# Patient Record
Sex: MALE | Race: OTHER | HISPANIC OR LATINO | URBAN - METROPOLITAN AREA
[De-identification: names, ages, dates, MRNs, and addresses within clinical notes are randomized per-mention and may not be internally consistent; named-entity substitution may affect disease eponyms.]

---

## 2021-11-23 ENCOUNTER — EMERGENCY (EMERGENCY)
Facility: HOSPITAL | Age: 26
LOS: 0 days | Discharge: HOME | End: 2021-11-23
Attending: EMERGENCY MEDICINE
Payer: MEDICAID

## 2021-11-23 VITALS
DIASTOLIC BLOOD PRESSURE: 68 MMHG | OXYGEN SATURATION: 96 % | SYSTOLIC BLOOD PRESSURE: 112 MMHG | RESPIRATION RATE: 18 BRPM | HEART RATE: 82 BPM | TEMPERATURE: 99 F

## 2021-11-23 VITALS — HEIGHT: 65 IN | WEIGHT: 199.96 LBS

## 2021-11-23 DIAGNOSIS — R30.9 PAINFUL MICTURITION, UNSPECIFIED: ICD-10-CM

## 2021-11-23 DIAGNOSIS — M54.9 DORSALGIA, UNSPECIFIED: ICD-10-CM

## 2021-11-23 DIAGNOSIS — R68.83 CHILLS (WITHOUT FEVER): ICD-10-CM

## 2021-11-23 DIAGNOSIS — D72.829 ELEVATED WHITE BLOOD CELL COUNT, UNSPECIFIED: ICD-10-CM

## 2021-11-23 LAB
ALBUMIN SERPL ELPH-MCNC: 4.4 G/DL — SIGNIFICANT CHANGE UP (ref 3.3–5)
ALP SERPL-CCNC: 74 U/L — SIGNIFICANT CHANGE UP (ref 40–120)
ALT FLD-CCNC: 45 U/L — SIGNIFICANT CHANGE UP (ref 12–78)
ANION GAP SERPL CALC-SCNC: 5 MMOL/L — SIGNIFICANT CHANGE UP (ref 5–17)
APPEARANCE UR: CLEAR — SIGNIFICANT CHANGE UP
AST SERPL-CCNC: 28 U/L — SIGNIFICANT CHANGE UP (ref 15–37)
BASOPHILS # BLD AUTO: 0.05 K/UL — SIGNIFICANT CHANGE UP (ref 0–0.2)
BASOPHILS NFR BLD AUTO: 0.3 % — SIGNIFICANT CHANGE UP (ref 0–2)
BILIRUB SERPL-MCNC: 1.2 MG/DL — SIGNIFICANT CHANGE UP (ref 0.2–1.2)
BILIRUB UR-MCNC: NEGATIVE — SIGNIFICANT CHANGE UP
BUN SERPL-MCNC: 13 MG/DL — SIGNIFICANT CHANGE UP (ref 7–23)
CALCIUM SERPL-MCNC: 9.3 MG/DL — SIGNIFICANT CHANGE UP (ref 8.5–10.1)
CHLORIDE SERPL-SCNC: 101 MMOL/L — SIGNIFICANT CHANGE UP (ref 96–108)
CO2 SERPL-SCNC: 29 MMOL/L — SIGNIFICANT CHANGE UP (ref 22–31)
COLOR SPEC: YELLOW — SIGNIFICANT CHANGE UP
CREAT SERPL-MCNC: 1.14 MG/DL — SIGNIFICANT CHANGE UP (ref 0.5–1.3)
DIFF PNL FLD: ABNORMAL
EOSINOPHIL # BLD AUTO: 0.08 K/UL — SIGNIFICANT CHANGE UP (ref 0–0.5)
EOSINOPHIL NFR BLD AUTO: 0.5 % — SIGNIFICANT CHANGE UP (ref 0–6)
GLUCOSE SERPL-MCNC: 91 MG/DL — SIGNIFICANT CHANGE UP (ref 70–99)
GLUCOSE UR QL: NEGATIVE MG/DL — SIGNIFICANT CHANGE UP
HCT VFR BLD CALC: 42.5 % — SIGNIFICANT CHANGE UP (ref 39–50)
HGB BLD-MCNC: 15.3 G/DL — SIGNIFICANT CHANGE UP (ref 13–17)
IMM GRANULOCYTES NFR BLD AUTO: 0.4 % — SIGNIFICANT CHANGE UP (ref 0–1.5)
KETONES UR-MCNC: NEGATIVE — SIGNIFICANT CHANGE UP
LEUKOCYTE ESTERASE UR-ACNC: NEGATIVE — SIGNIFICANT CHANGE UP
LIDOCAIN IGE QN: 140 U/L — SIGNIFICANT CHANGE UP (ref 73–393)
LYMPHOCYTES # BLD AUTO: 12 % — LOW (ref 13–44)
LYMPHOCYTES # BLD AUTO: 2 K/UL — SIGNIFICANT CHANGE UP (ref 1–3.3)
MCHC RBC-ENTMCNC: 30.8 PG — SIGNIFICANT CHANGE UP (ref 27–34)
MCHC RBC-ENTMCNC: 36 GM/DL — SIGNIFICANT CHANGE UP (ref 32–36)
MCV RBC AUTO: 85.5 FL — SIGNIFICANT CHANGE UP (ref 80–100)
MONOCYTES # BLD AUTO: 1.4 K/UL — HIGH (ref 0–0.9)
MONOCYTES NFR BLD AUTO: 8.4 % — SIGNIFICANT CHANGE UP (ref 2–14)
NEUTROPHILS # BLD AUTO: 13 K/UL — HIGH (ref 1.8–7.4)
NEUTROPHILS NFR BLD AUTO: 78.4 % — HIGH (ref 43–77)
NITRITE UR-MCNC: NEGATIVE — SIGNIFICANT CHANGE UP
PH UR: 6.5 — SIGNIFICANT CHANGE UP (ref 5–8)
PLATELET # BLD AUTO: 286 K/UL — SIGNIFICANT CHANGE UP (ref 150–400)
POTASSIUM SERPL-MCNC: 3.7 MMOL/L — SIGNIFICANT CHANGE UP (ref 3.5–5.3)
POTASSIUM SERPL-SCNC: 3.7 MMOL/L — SIGNIFICANT CHANGE UP (ref 3.5–5.3)
PROT SERPL-MCNC: 8.5 GM/DL — HIGH (ref 6–8.3)
PROT UR-MCNC: NEGATIVE MG/DL — SIGNIFICANT CHANGE UP
RBC # BLD: 4.97 M/UL — SIGNIFICANT CHANGE UP (ref 4.2–5.8)
RBC # FLD: 12.5 % — SIGNIFICANT CHANGE UP (ref 10.3–14.5)
SODIUM SERPL-SCNC: 135 MMOL/L — SIGNIFICANT CHANGE UP (ref 135–145)
SP GR SPEC: 1 — LOW (ref 1.01–1.02)
UROBILINOGEN FLD QL: NEGATIVE MG/DL — SIGNIFICANT CHANGE UP
WBC # BLD: 16.6 K/UL — HIGH (ref 3.8–10.5)
WBC # FLD AUTO: 16.6 K/UL — HIGH (ref 3.8–10.5)

## 2021-11-23 PROCEDURE — 99284 EMERGENCY DEPT VISIT MOD MDM: CPT | Mod: 25

## 2021-11-23 PROCEDURE — 74176 CT ABD & PELVIS W/O CONTRAST: CPT | Mod: MA

## 2021-11-23 PROCEDURE — 99285 EMERGENCY DEPT VISIT HI MDM: CPT

## 2021-11-23 PROCEDURE — 87086 URINE CULTURE/COLONY COUNT: CPT

## 2021-11-23 PROCEDURE — 87491 CHLMYD TRACH DNA AMP PROBE: CPT

## 2021-11-23 PROCEDURE — 81001 URINALYSIS AUTO W/SCOPE: CPT

## 2021-11-23 PROCEDURE — 87591 N.GONORRHOEAE DNA AMP PROB: CPT

## 2021-11-23 PROCEDURE — 74176 CT ABD & PELVIS W/O CONTRAST: CPT | Mod: 26,MA

## 2021-11-23 PROCEDURE — 80053 COMPREHEN METABOLIC PANEL: CPT

## 2021-11-23 PROCEDURE — 85025 COMPLETE CBC W/AUTO DIFF WBC: CPT

## 2021-11-23 PROCEDURE — 96360 HYDRATION IV INFUSION INIT: CPT

## 2021-11-23 PROCEDURE — 36415 COLL VENOUS BLD VENIPUNCTURE: CPT

## 2021-11-23 PROCEDURE — 83690 ASSAY OF LIPASE: CPT

## 2021-11-23 RX ORDER — SODIUM CHLORIDE 9 MG/ML
1000 INJECTION INTRAMUSCULAR; INTRAVENOUS; SUBCUTANEOUS ONCE
Refills: 0 | Status: COMPLETED | OUTPATIENT
Start: 2021-11-23 | End: 2021-11-23

## 2021-11-23 RX ORDER — IBUPROFEN 200 MG
600 TABLET ORAL ONCE
Refills: 0 | Status: COMPLETED | OUTPATIENT
Start: 2021-11-23 | End: 2021-11-23

## 2021-11-23 RX ORDER — IBUPROFEN 200 MG
1 TABLET ORAL
Qty: 12 | Refills: 0
Start: 2021-11-23 | End: 2021-11-25

## 2021-11-23 RX ORDER — CYCLOBENZAPRINE HYDROCHLORIDE 10 MG/1
1 TABLET, FILM COATED ORAL
Qty: 9 | Refills: 0
Start: 2021-11-23 | End: 2021-11-25

## 2021-11-23 RX ADMIN — SODIUM CHLORIDE 1000 MILLILITER(S): 9 INJECTION INTRAMUSCULAR; INTRAVENOUS; SUBCUTANEOUS at 01:33

## 2021-11-23 RX ADMIN — Medication 600 MILLIGRAM(S): at 12:03

## 2021-11-23 RX ADMIN — SODIUM CHLORIDE 1000 MILLILITER(S): 9 INJECTION INTRAMUSCULAR; INTRAVENOUS; SUBCUTANEOUS at 12:03

## 2021-11-23 NOTE — ED STATDOCS - ATTENDING CONTRIBUTION TO CARE
I, Makayla Alas MD,  performed the initial face to face bedside interview with this patient regarding history of present illness, review of symptoms and relevant past medical, social and family history.  I completed an independent physical examination.  I was the initial provider who evaluated this patient. I have signed out the follow up of any pending tests (i.e. labs, radiological studies) to the ACP.  I have communicated the patient’s plan of care and disposition with the ACP.  The history, relevant review of systems, past medical and surgical history, medical decision making, and physical examination was documented by the scribe in my presence and I attest to the accuracy of the documentation.

## 2021-11-23 NOTE — ED STATDOCS - PROGRESS NOTE DETAILS
Pt. is a 26 year old male presenting with intermittent LBP x 1 year.  No trauma or injury reported.  Pt. also c/o pain with urination and chills.  Neg. medical history.  Yvrose Mendez PA-C Plan for UA, GC Chlamydia, CT r/o renal colic.  Yvrose Mendez PA-C UA with scant blood, CT unremarkable, Moderate Leukocytosis.  STD testing pending.  Pt. to follow up with [facility].  Labs discussed.   return precautions discussed.  Yvrose Mendez PA-C UA with scant blood, CT unremarkable, Moderate Leukocytosis.  STD testing pending.  Pt. to follow up with [facility].  Labs discussed.   return precautions discussed.   # [id number]  Yvrsoe Mendez PA-C

## 2021-11-23 NOTE — ED STATDOCS - OBJECTIVE STATEMENT
25 y/o male with no significant PMHx presents to the ED c/o intermittent back pain x 1 year. No trauma or injury. +pain with urination, +chills. No hx of abd surgeries. Nonsmoker. No EtOH use. No drug use. No other complaints at this time.  ID#: [id number].

## 2021-11-23 NOTE — ED STATDOCS - PATIENT PORTAL LINK FT
You can access the FollowMyHealth Patient Portal offered by [facility] by registering at the following website: [email or web address]. By joining [employer]’s FollowMyHealth portal, you will also be able to view your health information using other applications (apps) compatible with our system.

## 2021-11-23 NOTE — ED STATDOCS - NSFOLLOWUPINSTRUCTIONS_ED_ALL_ED_FT
Leucocitosis    Leukocytosis      Leucocitosis significa que marlon persona tiene más glóbulos blancos que lo normal. Los glóbulos blancos se ritika en la médula ósea. La médula ósea es el tejido esponjoso que está en el interior de los huesos. La función principal de los glóbulos blancos es luchar contra las infecciones.    Tener demasiados glóbulos blancos es un problema frecuente. Puede aparecer carlos resultado de muchos tipos de problemas médicos.      ¿Cuáles son las causas?  La leucocitosis puede deberse a diversas afecciones. En algunos casos, la médula ósea puede ser normal, tin sigue produciendo demasiados glóbulos blancos. [place] puede ser consecuencia de lo siguiente:  •Infección.      •Lesiones.      •Estrés físico.      •Estrés emocional.      •Cirugía.      •Reacciones alérgicas.      •Tumores que no se inician en la ivan ni en la médula ósea.      •Marlon enfermedad congénita.      •Ciertos medicamentos.      •Embarazo y trabajo de parto.    En otros casos, es posible que haya un trastorno de la médula ósea que hipolito que el organismo produzca demasiados glóbulos blancos. Las enfermedades de la médula ósea pueden ser las siguientes:  •Leucemia. Gabi es un tipo de cáncer de la ivan.      •Trastornos mieloproliferativos. Estos trastornos hacen que las células sanguíneas crezcan de manera anormal.        ¿Cuáles son los signos o los síntomas?  Con frecuencia, esta afección no tiene síntomas. Algunas personas pueden tener síntomas debido a la enfermedad que origina la leucocitosis. Estos síntomas pueden incluir los siguientes:  •Sangrado.      •Moretones.      •Fiebre.      •Sudoración nocturna.      •Ganglios linfáticos hinchados.      •Agrandamiento del bazo.      •Infecciones repetidas.      •Debilidad.      •Pérdida de peso.        ¿Cómo se diagnostica?  Esta afección se diagnostica con análisis de ivan. Con frecuencia se detecta con un análisis de ivan que se realiza carlos parte de un examen físico de rutina. Podrán indicarle otras pruebas para ayudar a determinar por qué tiene demasiados glóbulos blancos. Estas pruebas pueden incluir lo siguiente:  •Un hemograma completo. Con gabi análisis se evalúan todos los tipos de células sanguíneas del organismo.      •Radiografía de tórax, análisis de orina u otros exámenes para buscar signos de infección.      •Aspiración de médula ósea. Para realizar esta prueba, se introduce marlon aguja en el hueso. Las células de la médula ósea se extraen con la aguja y se examinan con un microscopio.      •Otros estudios que se realizan con marlon muestra de ivan o médula ósea.      •Exploración por tomografía computarizada (TC), gammagrafía ósea u otros estudios de diagnóstico por imágenes.        ¿Cómo se trata?  Generalmente no es necesario realizar un tratamiento para la leucocitosis. Sin embargo, si la causa de la leucocitosis es marlon infección, un cáncer, un trastorno de la médula ósea u otro problema grave, deberá recibir tratamiento. El tratamiento puede incluir:  •Control periódico del recuento de glóbulos blancos para detectar cambios.      •Antibióticos, si tiene marlon infección bacteriana.      •Trasplante de médula ósea. La médula ósea enferma se reemplaza con células sanas que desarrollarán marlon nueva médula.      •Quimioterapia o terapias biológicas carlos el uso de anticuerpos. Estos tratamientos se pueden usar para destruir las células cancerosas o disminuir el número de glóbulos blancos.        Siga estas instrucciones en cunningham casa:    Medicamentos     •[place] los medicamentos de venta ryley y los recetados solamente carlos se lo haya indicado el médico.      •Si le recetaron un antibiótico, tómelo carlos se lo haya indicado el médico. No deje de caleb los antibióticos aunque comience a sentirse mejor.        Comida y bebida      •Consuma alimentos bajos en grasas saturadas y altos en contenido de fibra. Coma muchas frutas y verduras frescas.      •Juliana suficiente líquido carlos para mantener la orina de color amarillo pálido.      •Reduzca la ingesta de cafeína y alcohol.      Instrucciones generales     •Mantenga un peso saludable. Pregúntele al médico cuál es el peso más adecuado para usted.      •Hipolito 30 minutos de actividad física al menos 5 veces por semana. Hable con el médico antes de comenzar marlon nueva rutina de actividad física.      •Siga las precauciones de seguridad que le haya indicado el médico. [place] puede ser necesario si usted corre un mayor riesgo de infección o sangrado debido a cunningham afección.      • No consuma ningún producto que contenga nicotina o tabaco, carlos cigarrillos, cigarrillos electrónicos y tabaco de mascar. Si necesita ayuda para dejar de fumar, consulte al médico.      •Concurra a todas las visitas de seguimiento carlos se lo haya indicado el médico. [place] es importante.        Comuníquese con un médico si:    •Se siente débil o más cansado que de costumbre.      •Tiene escalofríos, tos o congestión nasal.      •Tiene fiebre.      •Pierde peso sin proponérselo.      •Transpira hazel la noche.      •Tiene moretones con facilidad.      •Tiene síntomas nuevos o greer síntomas empeoran.        Solicite ayuda inmediatamente si:    •Sangra más de lo normal.      •Siente dolor en el pecho.      •Tiene dificultad para respirar.      •Tiene náuseas o vómitos que no puede controlar.      •Se siente mareado o aturdido.        Resumen    •Leucocitosis significa que marlon persona tiene más glóbulos blancos que lo normal.      •Esta afección a menudo no causa síntomas.      •Esta afección puede deberse a diferentes trastornos.      •Si la causa de la leucocitosis es marlon infección, un cáncer, un trastorno de la médula ósea u otro problema grave, deberá recibir tratamiento.      •Concurra a todas las visitas de seguimiento carlos se lo haya indicado el médico. [place] es importante.      Esta información no tiene carlos fin reemplazar el consejo del médico. Asegúrese de hacerle al médico cualquier pregunta que tenga.      Dolor de espalda crónico    Chronic Back Pain    Cuando el dolor en la espalda dura más de 3 meses, se denomina dolor de espalda crónico. Es posible que se desconozca la causa de esta afección. Algunas causas frecuentes son las siguientes:  •Desgaste (enfermedad degenerativa) de los huesos, los ligamentos o los discos de la espalda.      •Inflamación y rigidez en la espalda (artritis).      Las personas que sufren dolor de espalda crónico generalmente atraviesan determinados períodos en los que gabi es más intenso (episodios de exacerbación del dolor). Muchas personas pueden aprender a controlar el dolor de espalda con el cuidado en el hogar.      Siga estas instrucciones en cunningham casa:    Esté atento a cualquier cambio en los síntomas. [place] estas medidas para aliviar el dolor:      Control del dolor y de la rigidez                 •Si se lo indican, aplique hielo sobre la ciro dolorida. El médico puede recomendarle que se aplique hielo hazel las primeras 24 a 48 horas después del comienzo de un episodio de exacerbación del dolor. Para hacer esto:  •Ponga el hielo en marlon bolsa plástica.      •Coloque marlon toalla entre la piel y la bolsa.      •Coloque el hielo hazel 20 minutos, 2 a 3 veces al día.      •Si se lo indican, aplique calor en la ciro afectada con la frecuencia que le haya indicado el médico. Use la elvira de calor que el médico le recomiende, carlos marlon compresa de calor húmedo o marlon almohadilla térmica.  •Coloque marlon toalla entre la piel y la elvira de calor.      •Aplique calor haezl 20 a 30 minutos.      •Retire la elvira de calor si la piel se pone de color barlow brillante. [place] es especialmente importante si no puede sentir dolor, calor o frío. Puede correr un riesgo mayor de sufrir quemaduras.        •Intente caleb un baño de inmersión con [place].        Actividad      •Evite agacharse y realizar otras actividades que agraven el problema.    •Mantenga marlon postura correcta mientras está de pie o sentado:  •Cuando esté de pie, mantenga la parte linda de la espalda y el alexsandra rectos, con los hombros hacia atrás. Evite encorvarse.      •Cuando esté sentado, mantenga la espalda recta y relaje los hombros. No curve los hombros ni los eche hacia atrás.        • No permanezca sentado o de pie en el mismo lugar hazel mucho tiempo.      •Hazel el día, descanse hazel lapsos breves. [place] le aliviará el dolor. Descansar recostado o de pie suele ser mejor que hacerlo sentado.      •Cuando descanse hazel períodos más largos, incorpore alguna actividad suave o ejercicios de elongación entre silvana y otro. [place] ayudará a evitar la rigidez y el dolor.      •Hipolito ejercicio con regularidad. Pregúntele al médico qué actividades son seguras para usted.    • No levante ningún objeto que pese más de 10 libras (4.5 kg) o que supere el límite de peso que le hayan indicado, hasta que el médico le diga que puede hacerlo. Siempre use las técnicas correctas para levantar objetos, entre ellas:  •Flexionar las rodillas.      •Mantener la carga cerca del cuerpo.      •No torcerse.        •Duerma sobre un colchón firme en marlon posición cómoda. Intente acostarse de costado, con las rodillas ligeramente flexionadas. Si se recuesta sobre la espalda, coloque marlon almohada debajo de las rodillas.      Medicamentos     •El tratamiento puede incluir medicamentos para el dolor y la inflamación que se may por boca o que se aplican sobre la piel, analgésicos recetados o relajantes musculares. Use los medicamentos de venta ryley y los recetados solamente carlos se lo haya indicado el médico.    •Pregúntele al médico si el medicamento recetado:  •Hace necesario que evite conducir o usar maquinaria.    •Puede causarle estreñimiento. Es posible que tenga que caleb estas medidas para prevenir o tratar el estreñimiento:  •Beber suficiente líquido carlos para mantener la orina de color amarillo pálido.      •Usar medicamentos recetados o de venta ryley.      •Consumir alimentos ricos en fibra, carlos frijoles, cereales integrales, y frutas y verduras frescas.      •Limitar el consumo de alimentos ricos en grasa y azúcares procesados, carlos los alimentos fritos o dulces.          Instrucciones generales     • No consuma ningún producto que contenga nicotina o tabaco, carlos cigarrillos, cigarrillos electrónicos y tabaco de mascar. Si necesita ayuda para dejar de consumir estos productos, consulte al médico.      •Concurra a todas las visitas de seguimiento carlos se lo haya indicado el médico. [place] es importante.        Comuníquese con un médico si:    •Siente un dolor que no se la con reposo o medicamentos.      •[place] dolor empeora o tiene un dolor nuevo.      •Tiene fiebre linda.      •Pierde de peso con rapidez.      •Tiene dificultad para realizar las actividades cotidianas.        Solicite ayuda de inmediato si:    •Siente debilidad o adormecimiento en marlon o ambas piernas, o en silvana o ambos pies.      •Tiene dificultad para controlar la micción o la defecación.    •Siente un dolor intenso en la espalda y tiene alguno de los siguientes síntomas:  •Náuseas o vómitos.      •Dolor en el abdomen.      •Le falta el aire o se desmaya.          Resumen    •El dolor de espalda crónico es aquel que dura más de 3 meses.      •Cuando comienza un episodio de exacerbación del dolor, aplique hielo en la ciro dolorida hazel las primeras 24 a 48 horas.      •Aplíquese marlon compresa de calor húmedo o marlon almohadilla térmica carlos se lo haya indicado el médico.      •Cuando descanse hazel períodos más largos, incorpore alguna actividad suave o ejercicios de elongación entre silvana y otro. [place] ayudará a evitar la rigidez y el dolor.      Esta información no tiene carlos fin reemplazar el consejo del médico. Asegúrese de hacerle al médico cualquier pregunta que tenga.

## 2021-11-23 NOTE — ED STATDOCS - CLINICAL SUMMARY MEDICAL DECISION MAKING FREE TEXT BOX
Pt with 1 year hx of back pain. Pt here for increasing pain. Pt now with pain with urination. Plan: labs, CT, urine.

## 2021-11-24 LAB
C TRACH RRNA SPEC QL NAA+PROBE: SIGNIFICANT CHANGE UP
CULTURE RESULTS: NO GROWTH — SIGNIFICANT CHANGE UP
N GONORRHOEA RRNA SPEC QL NAA+PROBE: SIGNIFICANT CHANGE UP
SPECIMEN SOURCE: SIGNIFICANT CHANGE UP
SPECIMEN SOURCE: SIGNIFICANT CHANGE UP

## 2023-01-07 ENCOUNTER — EMERGENCY (EMERGENCY)
Facility: HOSPITAL | Age: 28
LOS: 0 days | Discharge: HOME | End: 2023-01-08
Attending: EMERGENCY MEDICINE
Payer: MEDICAID

## 2023-01-07 VITALS — WEIGHT: 176.37 LBS | HEIGHT: 65 IN

## 2023-01-07 DIAGNOSIS — M79.10 MYALGIA, UNSPECIFIED SITE: ICD-10-CM

## 2023-01-07 DIAGNOSIS — U07.1 COVID-19: ICD-10-CM

## 2023-01-07 DIAGNOSIS — R50.9 FEVER, UNSPECIFIED: ICD-10-CM

## 2023-01-07 DIAGNOSIS — R30.0 DYSURIA: ICD-10-CM

## 2023-01-07 DIAGNOSIS — M54.50 LOW BACK PAIN, UNSPECIFIED: ICD-10-CM

## 2023-01-07 DIAGNOSIS — R31.9 HEMATURIA, UNSPECIFIED: ICD-10-CM

## 2023-01-07 DIAGNOSIS — J02.9 ACUTE PHARYNGITIS, UNSPECIFIED: ICD-10-CM

## 2023-01-07 LAB
APPEARANCE UR: CLEAR — SIGNIFICANT CHANGE UP
BILIRUB UR-MCNC: NEGATIVE — SIGNIFICANT CHANGE UP
COLOR SPEC: YELLOW — SIGNIFICANT CHANGE UP
DIFF PNL FLD: ABNORMAL
FLUAV AG NPH QL: SIGNIFICANT CHANGE UP
FLUBV AG NPH QL: SIGNIFICANT CHANGE UP
GLUCOSE UR QL: NEGATIVE — SIGNIFICANT CHANGE UP
KETONES UR-MCNC: NEGATIVE — SIGNIFICANT CHANGE UP
LEUKOCYTE ESTERASE UR-ACNC: NEGATIVE — SIGNIFICANT CHANGE UP
NITRITE UR-MCNC: NEGATIVE — SIGNIFICANT CHANGE UP
PH UR: 6.5 — SIGNIFICANT CHANGE UP (ref 5–8)
PROT UR-MCNC: NEGATIVE — SIGNIFICANT CHANGE UP
RSV RNA NPH QL NAA+NON-PROBE: SIGNIFICANT CHANGE UP
SARS-COV-2 RNA SPEC QL NAA+PROBE: DETECTED
SP GR SPEC: 1 — LOW (ref 1.01–1.02)
UROBILINOGEN FLD QL: NEGATIVE — SIGNIFICANT CHANGE UP

## 2023-01-07 PROCEDURE — 99283 EMERGENCY DEPT VISIT LOW MDM: CPT

## 2023-01-07 PROCEDURE — 99284 EMERGENCY DEPT VISIT MOD MDM: CPT

## 2023-01-07 PROCEDURE — 81001 URINALYSIS AUTO W/SCOPE: CPT

## 2023-01-07 PROCEDURE — 87086 URINE CULTURE/COLONY COUNT: CPT

## 2023-01-07 PROCEDURE — 0241U: CPT

## 2023-01-07 RX ORDER — IBUPROFEN 200 MG
600 TABLET ORAL ONCE
Refills: 0 | Status: COMPLETED | OUTPATIENT
Start: 2023-01-07 | End: 2023-01-07

## 2023-01-07 RX ORDER — ONDANSETRON 8 MG/1
4 TABLET, FILM COATED ORAL ONCE
Refills: 0 | Status: COMPLETED | OUTPATIENT
Start: 2023-01-07 | End: 2023-01-07

## 2023-01-07 RX ADMIN — Medication 600 MILLIGRAM(S): at 23:27

## 2023-01-07 RX ADMIN — ONDANSETRON 4 MILLIGRAM(S): 8 TABLET, FILM COATED ORAL at 23:27

## 2023-01-07 NOTE — ED PROVIDER NOTE - NSFOLLOWUPINSTRUCTIONS_ED_ALL_ED_FT
COVID-19    COVID-19      La enfermedad por coronavirus 2019, o COVID-19, es marlon infección sistémica causada por un nuevo coronavirus llamado SARS-CoV-2. En algunas personas, es posible que el virus no ocasione síntomas. En otras, puede ocasionar síntomas leves o graves. Algunas personas con infección grave desarrollan enfermedades graves, que puede provocar síndrome de dificultad respiratoria aguda y choque.      ¿Cuáles son las causas?  The human body, showing how the coronavirus travels from the air to a person's lungs.   Esta enfermedad es causada por un virus. El virus puede estar en el aire o en superficies en forma de gotitas o trista de varios tamaños. Usted puede contagiarse con gabi virus:  •Al inspirar las gotitas de marlon persona infectada. Las gotitas pueden diseminarse cuando marlon persona respira, habla, canta, tose o estornuda.      •Al tocar algo, carlos marlon bustillos o el picaporte de marlon mikey, que estuvo expuesto al virus (está contaminado) y luego tocarse la boca, nariz o los ojos.        ¿Qué incrementa el riesgo?    Riesgo de infección:     Es más probable que se infecte con el virus del COVID-19 si:  •Está a menos de 1.8 m (6 pies) de marlon persona con COVID-19 alexander 15 minutos o más.      •Está cuidando a marlon persona que está infectada con COVID-19.      •Está en contacto personal estrecho con otras personas. El contacto personal estrecho incluye abrazar, besar o compartir utensilios para comer o beber.      Riesgo de enfermedad grave debido al COVID-19:    Es más probable que se enferme gravemente por el virus de COVID-19 si:  •Tiene cáncer.    •Tiene marlon enfermedad a devi plazo (crónica), carlos las siguientes:  •Enfermedad pulmonar crónica, incluida embolia pulmonar, enfermedad pulmonar obstructiva crónica y fibrosis quística.      •Enfermedad crónica que disminuye la capacidad del cuerpo para combatir las infecciones (immunodeprimido).      •Afecciones cardíacas graves, carlos insuficiencia cardíaca, arteriopatía coronaria o miocardiopatía.      •Diabetes.      •Enfermedad renal crónica.      •Enfermedades hepáticas, carlos cirrosis, esteatosis hepática no alcohólica, hepatopatía alcohólica o hepatitis autoinmunitaria.        •Es jass.      •Está embarazada o se embarazó recientemente.      •Tiene anemia drepanocítica.        ¿Cuáles son los signos o síntomas?    Los síntomas de esta afección pueden ser de leves a graves. Los síntomas pueden aparecer en el término de 2 a 14 días después de logan estado expuesto al virus. Incluyen lo siguiente:  •Fiebre o escalofríos.      •Dificultad para respirar o falta de aire.      •Sentirse cansado o muy cansado.      •Cori de margie, cori en el cuerpo o cori musculares.      •Rinitis o congestión nasal, estornudos, tos, dolor de garganta.      •Nueva pérdida del sentido del gusto o del olfato. [place] es poco frecuente.      Algunas personas también pueden tener problemas estomacales, carlos náuseas, vómitos o diarrea.    Es posible que otras personas no tengan síntomas de COVID-19.      ¿Cómo se diagnostica?  A sample being collected by swabbing the nose.   Esta afección se puede diagnosticar mediante el análisis de muestras para detectar el virus del COVID-19. Las pruebas más frecuentes son la prueba PCR y la prueba de antígenos. Las pruebas pueden realizarse en el laboratorio o en el hogar. Incluyen lo siguiente:  •Usar un hisopo para caleb marlon muestra de líquido de la parte posterior de la nariz y la garganta (líquido nasofaríngeo), de la nariz o de la garganta.      •Analizar marlon muestra de saliva de la boca.      •Analizar marlon muestra de mucosidad expectorada de los pulmones (esputo).        ¿Cómo se trata?    El tratamiento del COVID-19 depende de la gravedad de la afección.  •Los síntomas leves pueden controlarse en el hogar con reposo, líquidos y medicamentos de venta ryley.    •Los síntomas graves pueden tratarse en marlon unidad de cuidados intensivos o UCI en el hospital. El tratamiento en la UCI puede incluir lo siguiente:  •Oxígeno complementario. Para administrar oxígeno extra, se utiliza un tubo en la nariz, marlon mascarilla o marlon luis de oxígeno.    •Medicamentos. Se le pueden administrar medicamentos:  •Para ayudar al organismo a combatir determinados virus que pueden causar enfermedades (antivíricos).      •Para disminuir la inflamación y suprimir el sistema inmunitario (corticoesteroides).      •Para prevenir o tratar coágulos de ivan, si se desarrollan (antitrombóticos).        •Anticuerpos fabricados en un laboratorio que pueden restaurar o fortalecer el sistema inmunitario natural del organismo (anticuerpo monoclonal).      •Colocarlo boca abajo (decúbito prono). [place] facilita el ingreso de oxígeno a los pulmones.      •Medidas para controlar marlon infección.        Si está en riesgo de padecer marlon enfermedad más grave por COVID-19, [place]unningham médico puede recetarle marlon combinación de dos anticuerpos monoclonales de acción prolongada, administrados juntos cada 6 meses.      ¿Cómo se previene?    Para protegerse:   •Vacúnese. Las vacunas contra el COVID-19 están disponibles para todas las personas mayores de 6 meses.  •Se recomienda la vacunación para las mujeres embarazadas, que pueden quedarse embarazadas o que están en período de lactancia.      •Los pacientes que requieran marlon cirugía mayor deben planificar cunningham vacunación varios días antes o después de la cirugía.      •Hable con cunningham médico sobre la posibilidad de recibir anticuerpos monoclonales experimentales. [place] tratamiento ha sido aprobado bajo autorización de uso de emergencia para prevenir enfermedades graves antes o después de que esté expuesto al COVID-19. Pueden administrarle anticuerpos monoclonales si:  •Está moderada o severamente inmunodeprimido. [place] incluye los tratamientos que reducen cunningham respuesta inmunitaria. Las personas inmunodeprimidas pueden no desarrollar protección contra el COVID-19 cuando se vacunan.      •No puede vacunarse. Es posible que no reciba marlon vacuna si tiene marlon reacción alérgica grave a la vacuna o a greer componentes.      •Si no tiene la vacunación completa.      •Está en contacto estrecho con marlon persona infectada con SARS-CoV-2 o con alto riesgo de exposición al SARS-CoV-2 en marlon institución en la que hay casos de COVID-19.      •Está en riesgo de contraer enfermedades por las nuevas variantes del virus del COVID-19.        Cómo proteger a los demás:     Si tiene síntomas de COVID-19, tome medidas para evitar que el virus se propague a otras personas.  •Quédese en cunningham casa. Salga de cunningham casa solo para buscar atención médica. No use el transporte público, de ser posible.      •No viaje mientras esté enfermo.      •Lávese las kelly frecuentemente con agua y jabón alexander al menos 20 segundos. Use desinfectante para kelly con alcohol si no dispone de agua y jabón.      •Manténgase alejado de quienes vivan con usted. Permita que los miembros de la ana sanos cuiden a los niños y las mascotas, si es posible. Si tiene que cuidar a los niños o las mascotas, lávese las kelly con frecuencia y use un barbijo. Si es posible, permanezca en cunningham habitación, separado de los demás. Utilice un baño diferente.      •Asegúrese de que todas las personas que viven en cunningham casa se laven ayden las kelly y con frecuencia.      •Tosa o estornude en un pañuelo de papel o sobre cunningham manga o codo. No tosa o estornude al aire ni se cubra la boca o la nariz con la mano.        Dónde obtener más información    •Centers for Disease Control and Prevention (CDC) (Centros para el Control y la Prevención de Enfermedades): www.cdc.gov/coronavirus      •World Health Organization (Organización Mundial de la [place]): www.who.int/health-topics/coronavirus        Solicite ayuda de inmediato si:    •Tiene dificultad para respirar.      •Siente dolor u opresión en el pecho.      •Experimenta confusión.      •Tiene las uñas de los dedos y los labios de color azulado.      •Tiene dificultad para despertarse.      •Los síntomas empeoran.      Estos síntomas pueden indicar marlon emergencia. Solicite ayuda de inmediato. Llame al 911.   • No espere a james si los síntomas desaparecen.        •  No conduzca por greer propios medios hasta el hospital.         Resumen    •El COVID-19 es marlon infección respiratoria causada por un virus.      •Algunas personas con infección grave por COVID-19 desarrollan enfermedades graves, que puede provocar síndrome de dificultad respiratoria aguda y choque.      •El virus que causa el COVID-19 es contagioso. [place] significa que puede transmitirse de marlon persona a otra a través de las gotitas que se despiden al respirar, hablar, cantar, toser y estornudar.      •Los síntomas leves de COVID-19 pueden controlarse en el hogar con reposo, líquidos y medicamentos de venta ryley.      Esta información no tiene carlos fin reemplazar el consejo del médico. Asegúrese de hacerle al médico cualquier pregunta que tenga.

## 2023-01-07 NOTE — ED PROVIDER NOTE - PATIENT PORTAL LINK FT
You can access the FollowMyHealth Patient Portal offered by [facility] by registering at the following website: [email or web address]. By joining [facility]’s FollowMyHealth portal, you will also be able to view your health information using other applications (apps) compatible with our system.

## 2023-01-07 NOTE — ED PROVIDER NOTE - NS ED ROS FT
Constitutional: nad, well appearing  HEENT:  no nasal congestion, eye drainage or ear pain.    CVS:  no cp  Resp:  No sob, no cough  GI:  no abdominal pain, + nausea, no vomiting  :  + dysuria  MSK: no joint pain or limited ROM  Skin: no rash  Neuro: no change in mental status or level of consciousness  Heme/lymph: no bleeding

## 2023-01-07 NOTE — ED PROVIDER NOTE - CARE PLAN
1 Principal Discharge DX:	2019 novel coronavirus disease (COVID-19)   Principal Discharge DX:	2019 novel coronavirus disease (COVID-19)  Secondary Diagnosis:	Hematuria

## 2023-01-07 NOTE — ED PROVIDER NOTE - PHYSICAL EXAMINATION
Constitutional: NAD, well appearing  HEENT: no rhinorrhea, mild pharyngeal erythema - midline uvula, no tonsillar edema or exudates, no tender cervical LAD  CVS:  RRR, no m/r/g  Resp:  CTAB  GI: soft, ntnd  MSK:  no restriction to rom, full ROM to all extremities. No midline T or L spine tenderness.  No CVAT.    Neuro:  A&Ox3, 5/5 strength to all extremities,  SILT to all extremities  Skin: no rash  psych: clear thought content  Heme/lymph:  No LAD

## 2023-01-07 NOTE — ED PROVIDER NOTE - CLINICAL SUMMARY MEDICAL DECISION MAKING FREE TEXT BOX
Pt well appearing.  Likely this represents flu or covid.  Has mild HA, body aches.  Myalgias and fevers with sore throat.  No suggestion of strep or deep space neck infection.  More likely viral syndrome.  Notes mild nausea, but no vomiting.  Will provide with zofran and ibuprofen and check ua given dysuria and mild lbp.  no red flags for dangerous etiology of low back pain.  Doubt uti.  No CVAT to suggest pyelo.  Dispo pending meds and reassessment.  Anticipate d/c home.

## 2023-01-07 NOTE — ED PROVIDER NOTE - OBJECTIVE STATEMENT
27 y M no sig pmh presenting with fever, chills, myalgias, sore throat and low back pain since yesterday while he was at work.  No significant cough.  No reported cp/sob.  Able to swallow, but with some pain.  Reports some mild dysuria.  No hematuria.  No LE neuro symptoms.  No prior back instrumentation.  No bowel or bladder incontinence or retention reported.  No hx/o IVDU.  #: [id number]    27 y M no sig pmh presenting with fever, chills, myalgias, sore throat and low back pain since yesterday while he was at work.  No significant cough.  No reported cp/sob.  Able to swallow, but with some pain.  Reports some mild dysuria.  No hematuria.  No LE neuro symptoms.  No prior back instrumentation.  No bowel or bladder incontinence or retention reported.  No hx/o IVDU.

## 2023-01-08 VITALS
SYSTOLIC BLOOD PRESSURE: 122 MMHG | TEMPERATURE: 99 F | OXYGEN SATURATION: 98 % | DIASTOLIC BLOOD PRESSURE: 74 MMHG | RESPIRATION RATE: 18 BRPM | HEART RATE: 91 BPM

## 2023-01-08 PROBLEM — Z78.9 OTHER SPECIFIED HEALTH STATUS: Chronic | Status: ACTIVE | Noted: 2021-11-23

## 2023-01-09 LAB
CULTURE RESULTS: NO GROWTH — SIGNIFICANT CHANGE UP
SPECIMEN SOURCE: SIGNIFICANT CHANGE UP
